# Patient Record
(demographics unavailable — no encounter records)

---

## 2024-10-31 NOTE — PHYSICAL EXAM
[FreeTextEntry1] : On physical examination there is no swelling or tenderness at the fracture site of the right clavicle.  Patient has a full range of motion with normal shoulder strength.  There is no evidence of shoulder instability.

## 2024-10-31 NOTE — ASSESSMENT
[FreeTextEntry1] : Fracture right clavicle  The patient will return to full activity within approximately 10 days to 2 weeks.  He will return on a as needed basis.

## 2024-10-31 NOTE — HISTORY OF PRESENT ILLNESS
[FreeTextEntry1] : This 6-year-old male returns for reevaluation of a fracture of the right clavicular shaft.  The patient is asymptomatic at this time.  He has no neurovascular complaints.

## 2024-10-31 NOTE — DATA REVIEWED
[de-identified] : Review of x-rays of the right clavicle performed at the [facility] on 10/29/2024 (AP and oblique views) reveals a healed fracture of the right clavicle.